# Patient Record
Sex: FEMALE | Race: WHITE
[De-identification: names, ages, dates, MRNs, and addresses within clinical notes are randomized per-mention and may not be internally consistent; named-entity substitution may affect disease eponyms.]

---

## 2017-08-30 NOTE — NUR
1615 REPORT GIVEN TO [facility] CHARGE NURSE, ACCEPTING MD DR ALBARADO ETA 15 
MINUTES

1619 PT TRANSPOPatient to be transferred to [facility].  Is being 
transferred due to NEED HIGHER LEVEL OF CARE.  Receiving facility has accepting 
physician and available space. ER physician has signed transfer form.  Patient 
or responsible party has agreed to transfer and signed form.  Patient 
belongings inventoried and will be sent with patient.  Copy of nursing notes, 
lab reports, EKG, Physicians Orders and X-rays to be sent with patient.  Report 
called to CHARGE ER RN at receiving facility.  REPORT GIVEN TO [employer]ED[employer] VITA 
AT BEDSIDE ETA AT ACCEPTING FACILITY IN 15MINUTES.

## 2017-08-30 NOTE — NUR
PT PRESENTS TO ER W/C/O LEFT HIP PAIN SINCE LAST NOC. PT DENIES ANY FALL OR 
TRAUMA. PT STATES SHE WAS ADMITTED HERE 1 WEEK AGO FOR SAME S/SX AND WAS GIVEN 
PAIN MEDICATION, WHICH HAS SINCE RAN OUT, AND NOW THE PAIN IS BACK. HX DM. 
DENIES N/V/D; SKIN IS PINK/WARM/DRY; AAOX4 WITH EVEN AND STEADY GAIT; LUNGS 
CLEAR BL; HR EVEN AND REGULAR; PT DENIES ANY FEVER, CP, SOB, OR COUGH AT THIS 
TIME; PATIENT STATES PAIN OF 10/10 AT THIS TIME; VSS; PATIENT POSITIONED FOR 
COMFORT; HOB ELEVATED; BEDRAILS UP X2; BED DOWN. ER MD MADE AWARE OF PT STATUS.

## 2018-05-01 NOTE — NUR
Patient discharged with v/s stable. Written and verbal after care instructions 
given and explained. Patient alert, oriented and verbalized understanding of 
instructions. Ambulatory with steady gait. All questions addressed prior to 
discharge. ID band removed. Patient advised to follow up with PMD. Rx of 
bactrium ds, clindamycin and toradol given. Patient educated on indication of 
medication including possible reaction and side effects. Opportunity to ask 
questions provided and answered.

## 2019-12-28 NOTE — NUR
25 Y/O FEMALE, PRESENTS TO ED C/O VAGINAL PAIN, 10/10. PT STATES HAVING VAGINAL 
CYST X2 WEEKS, PAIN WORSENING TODAY. PT TOOK MOTRIN AND UNKNOWN ABX, NO RELIEF 
FROM PAIN. NO ABNORMAL DISCHARGE/BLEEDING, PER PT. PT STATES HAVING DIFFICULTY 
URINATING DUE TO PAIN. DENIES N/V/D. PT VSS. MOTHER AT BEDSIDE. ERMD AWARE. 
WILL CONTINUE TO MONITOR.

## 2019-12-29 NOTE — NUR
Patient discharged with v/s stable. She states pain tollerable 4/10. Written 
and verbal after care instructions given and explained. Patient alert, oriented 
and verbalized understanding of instructions. Ambulatory with steady gait. All 
questions addressed prior to discharge. ID band removed. Patient advised to 
follow up with PMD adn when to return to ER for F/U appointment. Rx of Motrin, 
Bactrim, and Tramadol given. Patient educated on indication of medication 
including possible reaction and side effects. Opportunity to ask questions 
provided and answered.

## 2023-03-08 ENCOUNTER — HOSPITAL ENCOUNTER (INPATIENT)
Dept: HOSPITAL 26 - MED | Age: 28
LOS: 2 days | Discharge: HOME | DRG: 640 | End: 2023-03-10
Attending: STUDENT IN AN ORGANIZED HEALTH CARE EDUCATION/TRAINING PROGRAM | Admitting: STUDENT IN AN ORGANIZED HEALTH CARE EDUCATION/TRAINING PROGRAM
Payer: COMMERCIAL

## 2023-03-08 VITALS — DIASTOLIC BLOOD PRESSURE: 53 MMHG | SYSTOLIC BLOOD PRESSURE: 107 MMHG

## 2023-03-08 VITALS — WEIGHT: 202 LBS | HEIGHT: 62 IN | BODY MASS INDEX: 37.17 KG/M2

## 2023-03-08 VITALS — SYSTOLIC BLOOD PRESSURE: 95 MMHG | DIASTOLIC BLOOD PRESSURE: 51 MMHG

## 2023-03-08 VITALS — SYSTOLIC BLOOD PRESSURE: 124 MMHG | DIASTOLIC BLOOD PRESSURE: 56 MMHG

## 2023-03-08 DIAGNOSIS — Z79.4: ICD-10-CM

## 2023-03-08 DIAGNOSIS — K74.60: ICD-10-CM

## 2023-03-08 DIAGNOSIS — Z20.822: ICD-10-CM

## 2023-03-08 DIAGNOSIS — Z79.899: ICD-10-CM

## 2023-03-08 DIAGNOSIS — R18.8: ICD-10-CM

## 2023-03-08 DIAGNOSIS — E87.3: ICD-10-CM

## 2023-03-08 DIAGNOSIS — N18.6: ICD-10-CM

## 2023-03-08 DIAGNOSIS — N19: ICD-10-CM

## 2023-03-08 DIAGNOSIS — D64.9: ICD-10-CM

## 2023-03-08 DIAGNOSIS — E86.0: Primary | ICD-10-CM

## 2023-03-08 DIAGNOSIS — I12.0: ICD-10-CM

## 2023-03-08 LAB
ALBUMIN FLD-MCNC: 3.9 G/DL (ref 3.4–5)
ANION GAP SERPL CALCULATED.3IONS-SCNC: 10.7 MMOL/L (ref 8–16)
AST SERPL-CCNC: 4 U/L (ref 15–37)
BASOPHILS # BLD AUTO: 0.1 K/UL (ref 0–0.22)
BASOPHILS NFR BLD AUTO: 0.9 % (ref 0–2)
BILIRUB SERPL-MCNC: 0.7 MG/DL (ref 0–1)
BUN SERPL-MCNC: 15 MG/DL (ref 7–18)
CHLORIDE SERPL-SCNC: 94 MMOL/L (ref 98–107)
CO2 SERPL-SCNC: 35.2 MMOL/L (ref 21–32)
CREAT SERPL-MCNC: 3.7 MG/DL (ref 0.6–1.3)
EOSINOPHIL # BLD AUTO: 0.4 K/UL (ref 0–0.4)
EOSINOPHIL NFR BLD AUTO: 5.8 % (ref 0–4)
ERYTHROCYTE [DISTWIDTH] IN BLOOD BY AUTOMATED COUNT: 16.3 % (ref 11.6–13.7)
GFR SERPL CREATININE-BSD FRML MDRD: 19 ML/MIN (ref 90–?)
GLUCOSE SERPL-MCNC: 165 MG/DL (ref 74–106)
HCT VFR BLD AUTO: 33.2 % (ref 36–48)
HGB BLD-MCNC: 10.8 G/DL (ref 12–16)
LYMPHOCYTES # BLD AUTO: 1.2 K/UL (ref 2.5–16.5)
LYMPHOCYTES NFR BLD AUTO: 16 % (ref 20.5–51.1)
MCH RBC QN AUTO: 31 PG (ref 27–31)
MCHC RBC AUTO-ENTMCNC: 33 G/DL (ref 33–37)
MCV RBC AUTO: 95 FL (ref 80–94)
MONOCYTES # BLD AUTO: 0.8 K/UL (ref 0.8–1)
MONOCYTES NFR BLD AUTO: 10.8 % (ref 1.7–9.3)
NEUTROPHILS # BLD AUTO: 5.1 K/UL (ref 1.8–7.7)
NEUTROPHILS NFR BLD AUTO: 66.5 % (ref 42.2–75.2)
PLATELET # BLD AUTO: 300 K/UL (ref 140–450)
POTASSIUM SERPL-SCNC: 3.9 MMOL/L (ref 3.5–5.1)
RBC # BLD AUTO: 3.49 MIL/UL (ref 4.2–5.4)
SODIUM SERPL-SCNC: 136 MMOL/L (ref 136–145)
WBC # BLD AUTO: 7.7 K/UL (ref 4.8–10.8)

## 2023-03-08 RX ADMIN — Medication SCH DEV: at 21:17

## 2023-03-08 RX ADMIN — Medication SCH DEV: at 17:05

## 2023-03-08 RX ADMIN — MORPHINE SULFATE PRN MG: 2 INJECTION, SOLUTION INTRAMUSCULAR; INTRAVENOUS at 17:35

## 2023-03-08 NOTE — NUR
STRAIGHT CATH REMOVED WITHOUT COMPLICATION. MINIMAL URINE NOTED ON THE ROBB 
LINE UPON REMOVAL. UNABLE TO COLLECT SAMPLE D/T SMALL QUANTITY.

## 2023-03-08 NOTE — NUR
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 
SEE ON LEFT EYE SHADOW, LEFT UPPER ARM DIALYSIS FISTULA, SHE HAD DIALYSES TODAY AND COMPLAIN 
OF GENERAL WEAKNESS, SO BY AMBULANCE BROUGHT TO ER , PT REORIENTED TO THE HOSPITAL 
ENVIRONMENT AND MEDICATED AS ORDERED FOR PAIN IN BASILATERAL [id number]

## 2023-03-08 NOTE — NUR
Patient will be admitted to care of MD BANGURA. Admited to TELE.  Will go to 
room 114A. Belongings list completed.  Report to SOHAM MCMAHAN .

## 2023-03-08 NOTE — NUR
STRAIGHT CATH INSERTED WITHOUT COMPLICATION PER DR STONE VERBAL ORDER. NO URINE 
DRAINED FROM CATH. ERMD MADE AWARE. EKG DONE AT BEDSIDE

## 2023-03-08 NOTE — NUR
28/F BIBA FROM DYIALYSIS CENTER D/T HYPOTENSION, LOW BACK PAIN, CHEST PAIN, AND 
GEN BODY PAIN AFTER DIALYSIS COMPLETION. ENS REPORTS DIALYSIS MWF. PT MISSED 
MONDAY DIALYSIS. AV FISUTULA NOTED TO LEFT UPPER ARM. AAO4, ON MONITOR AND 
GOWN. 





PMH: HTN, acute kidney failure, DM2, ascites

Meds: zoloft, novolog, carvedilol, furosemide, lisinopril, fosamax, amlodipine, 
gabapentin, metoclopramide, omperazole,

## 2023-03-09 VITALS — DIASTOLIC BLOOD PRESSURE: 80 MMHG | SYSTOLIC BLOOD PRESSURE: 180 MMHG

## 2023-03-09 VITALS — DIASTOLIC BLOOD PRESSURE: 75 MMHG | SYSTOLIC BLOOD PRESSURE: 151 MMHG

## 2023-03-09 VITALS — SYSTOLIC BLOOD PRESSURE: 146 MMHG | DIASTOLIC BLOOD PRESSURE: 58 MMHG

## 2023-03-09 VITALS — SYSTOLIC BLOOD PRESSURE: 144 MMHG | DIASTOLIC BLOOD PRESSURE: 78 MMHG

## 2023-03-09 VITALS — DIASTOLIC BLOOD PRESSURE: 74 MMHG | SYSTOLIC BLOOD PRESSURE: 154 MMHG

## 2023-03-09 VITALS — SYSTOLIC BLOOD PRESSURE: 154 MMHG | DIASTOLIC BLOOD PRESSURE: 72 MMHG

## 2023-03-09 LAB
ANION GAP SERPL CALCULATED.3IONS-SCNC: 11.8 MMOL/L (ref 8–16)
BASOPHILS # BLD AUTO: 0.1 K/UL (ref 0–0.22)
BASOPHILS NFR BLD AUTO: 0.9 % (ref 0–2)
BUN SERPL-MCNC: 29 MG/DL (ref 7–18)
CHLORIDE SERPL-SCNC: 94 MMOL/L (ref 98–107)
CO2 SERPL-SCNC: 36.1 MMOL/L (ref 21–32)
CREAT SERPL-MCNC: 5.7 MG/DL (ref 0.6–1.3)
EOSINOPHIL # BLD AUTO: 0.6 K/UL (ref 0–0.4)
EOSINOPHIL NFR BLD AUTO: 7.5 % (ref 0–4)
ERYTHROCYTE [DISTWIDTH] IN BLOOD BY AUTOMATED COUNT: 15.9 % (ref 11.6–13.7)
GFR SERPL CREATININE-BSD FRML MDRD: 11 ML/MIN (ref 90–?)
GLUCOSE SERPL-MCNC: 130 MG/DL (ref 74–106)
HCT VFR BLD AUTO: 31.6 % (ref 36–48)
HGB BLD-MCNC: 10.3 G/DL (ref 12–16)
LYMPHOCYTES # BLD AUTO: 1.7 K/UL (ref 2.5–16.5)
LYMPHOCYTES NFR BLD AUTO: 20.3 % (ref 20.5–51.1)
MCH RBC QN AUTO: 32 PG (ref 27–31)
MCHC RBC AUTO-ENTMCNC: 33 G/DL (ref 33–37)
MCV RBC AUTO: 96.7 FL (ref 80–94)
MONOCYTES # BLD AUTO: 1 K/UL (ref 0.8–1)
MONOCYTES NFR BLD AUTO: 11.5 % (ref 1.7–9.3)
NEUTROPHILS # BLD AUTO: 5.1 K/UL (ref 1.8–7.7)
NEUTROPHILS NFR BLD AUTO: 59.8 % (ref 42.2–75.2)
PLATELET # BLD AUTO: 290 K/UL (ref 140–450)
POTASSIUM SERPL-SCNC: 4.9 MMOL/L (ref 3.5–5.1)
RBC # BLD AUTO: 3.26 MIL/UL (ref 4.2–5.4)
SODIUM SERPL-SCNC: 137 MMOL/L (ref 136–145)
WBC # BLD AUTO: 8.5 K/UL (ref 4.8–10.8)

## 2023-03-09 PROCEDURE — 5A1D70Z PERFORMANCE OF URINARY FILTRATION, INTERMITTENT, LESS THAN 6 HOURS PER DAY: ICD-10-PCS | Performed by: STUDENT IN AN ORGANIZED HEALTH CARE EDUCATION/TRAINING PROGRAM

## 2023-03-09 RX ADMIN — MORPHINE SULFATE PRN MG: 2 INJECTION, SOLUTION INTRAMUSCULAR; INTRAVENOUS at 13:46

## 2023-03-09 RX ADMIN — MORPHINE SULFATE PRN MG: 2 INJECTION, SOLUTION INTRAMUSCULAR; INTRAVENOUS at 18:26

## 2023-03-09 RX ADMIN — SODIUM CHLORIDE PRN MG: 9 INJECTION, SOLUTION INTRAVENOUS at 18:27

## 2023-03-09 RX ADMIN — SODIUM CHLORIDE PRN MG: 9 INJECTION, SOLUTION INTRAVENOUS at 01:08

## 2023-03-09 RX ADMIN — Medication SCH DEV: at 06:48

## 2023-03-09 RX ADMIN — SODIUM CHLORIDE PRN MG: 9 INJECTION, SOLUTION INTRAVENOUS at 09:00

## 2023-03-09 RX ADMIN — Medication SCH DEV: at 16:30

## 2023-03-09 RX ADMIN — Medication SCH DEV: at 12:04

## 2023-03-09 RX ADMIN — MORPHINE SULFATE PRN MG: 2 INJECTION, SOLUTION INTRAMUSCULAR; INTRAVENOUS at 01:07

## 2023-03-09 RX ADMIN — MORPHINE SULFATE PRN MG: 2 INJECTION, SOLUTION INTRAMUSCULAR; INTRAVENOUS at 08:59

## 2023-03-09 RX ADMIN — Medication SCH DEV: at 21:34

## 2023-03-09 NOTE — NUR
PT REPORTS PAIN LEVEL OF 10/10, PRN PAIN MEDICATION ADMINISTERED PER MD ORDER. PT TOLERATED 
ADMINISTRATION. PRN ANTIEMETIC MEDICATION ADMINISTERED PER MD ORDER, PT TOLERATED. IV PATENT 
AND INTACT. EDUCATION PROVIDED REGARDING MEDICATION. ALL SAFETY MEASURES IN PLACE, CALL 
LIGHT WITHIN REACH.

## 2023-03-09 NOTE — NUR
EXPRESSED RELIEF FROM PAIN, APPEARS COMFORTABLE

FROM PAIN LEVEL OF 8/10 TO PAIN LEVEL OF 2/10 AFTER THE INTERVENTION

## 2023-03-09 NOTE — NUR
PATIENT HAS BEEN SCREENED AND CATEGORIZED AS MODERATE NUTRITION RISK. PATIENT WILL BE SEEN 
WITHIN 3-5 DAYS OF ADMISSION.



3/11/233/13/23



REVIEWED BY MOUNA LUNA RD

## 2023-03-09 NOTE — NUR
DC PLANNING 



*** ASSESSMENT COMPLETE***



PLEASE REFER TO ASSESSMENT FOR DETAILS





PT REPORTS UTILIZING FWW/WC/BC AND REQUIRES ASSISTANCE WITH ADL'S THAT PTS 

 AIDS WITH.



PT REPORTS RECEIVING IHSS HOURS HOWEVER STRUGGLED TO RECALL AMOUNT OF 

HOURS ALLOTTED EACH MONTH. 



PT REPORTS MEETING WITH PSYCHIATRIST 1X/ MONTHLY AND IS WORKING ON 

OBTAINING MENTAL HEALTH THERAPIST FOR WEEKLY/MONTHLY SESSIONS. 



PT UTILIZES TRANSPORTATION SERVICE WITH [facility] TO AND FROM DIALYSIS CENTER





PT RESIDES IN A MOBILE HOME WITH HER FAMILY (, PARENTS, SIBLINGS) 

AT THE ADDRESS LISTED ON FILE.



PT REPORTS DC PLAN IS TO RETURN HOME WITH  PROVIDING TRANSPORTATION, WHEN MEDICALLY 
STABLE. SW ENDORSED TO PT IF  IS UNABLE TO PROVIDE TRANSPORTATION TO NOTIFY NURSE WHO 
WILL BE IN TOUCH WITH SW AND TRANSPORTATION CAN BE ARRANGED WITH IE, PT VERBALIZED 
UNDERSTANDING


-------------------------------------------------------------------------------

Addendum: 03/10/23 at 0857 by Sandy NARANJO

-------------------------------------------------------------------------------

Amended: Links added.

## 2023-03-09 NOTE — NUR
RECEIVED IN NO ACUTE DISTRESS, ASLEEP AT THIS TIME. ALL SAFETY MEASURES IN PLACE.

CALL LIGHT WITHIN REACH

## 2023-03-10 VITALS — SYSTOLIC BLOOD PRESSURE: 195 MMHG | DIASTOLIC BLOOD PRESSURE: 79 MMHG

## 2023-03-10 VITALS — SYSTOLIC BLOOD PRESSURE: 148 MMHG | DIASTOLIC BLOOD PRESSURE: 76 MMHG

## 2023-03-10 VITALS — DIASTOLIC BLOOD PRESSURE: 79 MMHG | SYSTOLIC BLOOD PRESSURE: 187 MMHG

## 2023-03-10 LAB
ANION GAP SERPL CALCULATED.3IONS-SCNC: 15.3 MMOL/L (ref 8–16)
BASOPHILS # BLD AUTO: 0.1 K/UL (ref 0–0.22)
BASOPHILS NFR BLD AUTO: 1 % (ref 0–2)
BUN SERPL-MCNC: 38 MG/DL (ref 7–18)
CHLORIDE SERPL-SCNC: 92 MMOL/L (ref 98–107)
CO2 SERPL-SCNC: 33.4 MMOL/L (ref 21–32)
CREAT SERPL-MCNC: 7.1 MG/DL (ref 0.6–1.3)
EOSINOPHIL # BLD AUTO: 0.9 K/UL (ref 0–0.4)
EOSINOPHIL NFR BLD AUTO: 7 % (ref 0–4)
ERYTHROCYTE [DISTWIDTH] IN BLOOD BY AUTOMATED COUNT: 16.3 % (ref 11.6–13.7)
GFR SERPL CREATININE-BSD FRML MDRD: 9 ML/MIN (ref 90–?)
GLUCOSE SERPL-MCNC: 141 MG/DL (ref 74–106)
HCT VFR BLD AUTO: 29.8 % (ref 36–48)
HGB BLD-MCNC: 10.6 G/DL (ref 12–16)
LYMPHOCYTES # BLD AUTO: 1.8 K/UL (ref 2.5–16.5)
LYMPHOCYTES NFR BLD AUTO: 13.7 % (ref 20.5–51.1)
MCH RBC QN AUTO: 33 PG (ref 27–31)
MCHC RBC AUTO-ENTMCNC: 36 G/DL (ref 33–37)
MCV RBC AUTO: 93.8 FL (ref 80–94)
MONOCYTES # BLD AUTO: 1 K/UL (ref 0.8–1)
MONOCYTES NFR BLD AUTO: 7.7 % (ref 1.7–9.3)
NEUTROPHILS # BLD AUTO: 9.1 K/UL (ref 1.8–7.7)
NEUTROPHILS NFR BLD AUTO: 70.6 % (ref 42.2–75.2)
PLATELET # BLD AUTO: 335 K/UL (ref 140–450)
POTASSIUM SERPL-SCNC: 4.7 MMOL/L (ref 3.5–5.1)
RBC # BLD AUTO: 3.18 MIL/UL (ref 4.2–5.4)
SODIUM SERPL-SCNC: 136 MMOL/L (ref 136–145)
WBC # BLD AUTO: 12.9 K/UL (ref 4.8–10.8)

## 2023-03-10 RX ADMIN — MORPHINE SULFATE PRN MG: 2 INJECTION, SOLUTION INTRAMUSCULAR; INTRAVENOUS at 09:56

## 2023-03-10 RX ADMIN — Medication SCH DEV: at 06:43

## 2023-03-10 RX ADMIN — MORPHINE SULFATE PRN MG: 2 INJECTION, SOLUTION INTRAMUSCULAR; INTRAVENOUS at 00:43

## 2023-03-10 RX ADMIN — SODIUM CHLORIDE PRN MG: 9 INJECTION, SOLUTION INTRAVENOUS at 00:43

## 2023-03-10 RX ADMIN — SODIUM CHLORIDE PRN MG: 9 INJECTION, SOLUTION INTRAVENOUS at 09:57

## 2023-03-10 RX ADMIN — Medication SCH DEV: at 11:46

## 2023-03-10 NOTE — NUR
PT REPORTS SEVERE PAIN AND NAUSEA, PRN MEDICATION ADMINISTERED PER MD ORDER, VSS. IV PATENT 
AND INTACT

## 2023-03-10 NOTE — NUR
MD MADE AWARE OF PTS CONTINUOUS NAUSEA, MD STATES TO HOLD DC, TORB FOR REGLAN. PT AND PTS 
 MADE AWARE, PT STARTED CRYING STATING SHE FEELS LONELY AT NIGHT.  STATES PT 
HAS BEEN DIAGNOSED WITH SEPARATION ANXIETY, CHARGE NURSE NOTIFIED AND STATED PTS  CAN 
STAY THE NIGHT IN THE ROOM.

## 2023-03-10 NOTE — NUR
AT 1243 DR CRAIG AT BEDSIDE, STATED PT IS DOING BETTER WITH NAUSEA AND ABLE TO KEEP FOOD 
DOWN. DR BANGURA CALLED AND DISCUSSED CASE WITH DR CRAIG, AGREED PT IS STABLE FOR DISCHARGE 
AND CAN GET OUTPATIENT DIALYSIS. DR CRAIG CALLED PTS OUTPT DIALYSIS CENTER, CENTER STATED 
THEY CAN SEE PT TODAY. PT AGREEABLE, IV REMOVED CATH IN PLACE. TELE BOX PLACED IN MONITOR 
ROOM, FULL DISCHARGE INSTRUCTIONS GIVEN TO PT AND PTS , ALL QUESTIONS ANSWERED. ALL 
BELONGINGS IN POSSESSION, PT WHEELCHAIRED OUT IN STABLE CONDITION.

## 2023-03-10 NOTE — NUR
PT A/OX4, WITH C/O OF SEVERE PAIN 10/10, BACK AND NAUSEA AT 0058, PAIN. B/P INCREASED TO 
187/79ADMIN MORPHINE 2MG IV , ZOFRAN AS ORDERED, PT'S B/P REMAIN HIGH, ADMIN HYDRALAZINE 
10MG AS ORDERED, .PT'S B/P DECREASED /76. AT 01:58. PT CONT. WITH SEVERE ANXIETY, 
CRYING. ADMIN ATIVAN 2MG AS ORDERED, HELPFUL. PT CARE ENDORSED TO COMING TO INCOMING RN

## 2023-03-10 NOTE — NUR
RECEIVED REPORT FROM NIGHT SHIFT NURSE FOR CONTINUITY OF CARE, POC DISCUSSED. PT IS RESTING 
IN BED, ABLE TO MAKE NEEDS KNOWN. ALL NIGHT SHIFT EVENTS DISCUSSED. ALL SAFETY MEASURES IN 
PLACE, CALL LIGHT WITHIN REACH.

## 2023-03-10 NOTE — NUR
[facility] BP MEDICATION HELD DUE TO [facility].

-------------------------------------------------------------------------------

Addendum: 03/10/23 at 0821 by Agency 10 RN RN

-------------------------------------------------------------------------------

BLOOD PRESSURE 195/87, [facility] BP MEDICATION ADMINISTERED PER MD HEREDIA